# Patient Record
Sex: MALE | Race: OTHER | HISPANIC OR LATINO
[De-identification: names, ages, dates, MRNs, and addresses within clinical notes are randomized per-mention and may not be internally consistent; named-entity substitution may affect disease eponyms.]

---
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
## 2020-12-09 PROBLEM — M25.562 LEFT KNEE PAIN: Status: ACTIVE | Noted: 2020-12-09

## 2020-12-10 ENCOUNTER — APPOINTMENT (OUTPATIENT)
Dept: ORTHOPEDIC SURGERY | Facility: CLINIC | Age: 20
End: 2020-12-10

## 2020-12-10 DIAGNOSIS — M25.562 PAIN IN LEFT KNEE: ICD-10-CM

## 2020-12-17 ENCOUNTER — APPOINTMENT (OUTPATIENT)
Dept: ORTHOPEDIC SURGERY | Facility: CLINIC | Age: 20
End: 2020-12-17

## 2021-01-11 ENCOUNTER — APPOINTMENT (OUTPATIENT)
Dept: ORTHOPEDIC SURGERY | Facility: CLINIC | Age: 21
End: 2021-01-11
Payer: MEDICAID

## 2021-01-11 VITALS — TEMPERATURE: 97.8 F

## 2021-01-11 VITALS — BODY MASS INDEX: 25.76 KG/M2 | HEIGHT: 68 IN | WEIGHT: 170 LBS

## 2021-01-11 DIAGNOSIS — G89.29 DORSALGIA, UNSPECIFIED: ICD-10-CM

## 2021-01-11 DIAGNOSIS — M54.2 CERVICALGIA: ICD-10-CM

## 2021-01-11 DIAGNOSIS — M54.9 DORSALGIA, UNSPECIFIED: ICD-10-CM

## 2021-01-11 PROCEDURE — 99072 ADDL SUPL MATRL&STAF TM PHE: CPT

## 2021-01-11 PROCEDURE — 99204 OFFICE O/P NEW MOD 45 MIN: CPT

## 2021-01-11 PROCEDURE — 72040 X-RAY EXAM NECK SPINE 2-3 VW: CPT

## 2021-01-11 RX ORDER — NAPROXEN 500 MG/1
500 TABLET ORAL
Qty: 60 | Refills: 0 | Status: ACTIVE | COMMUNITY
Start: 2021-01-11 | End: 1900-01-01

## 2021-01-11 NOTE — DISCUSSION/SUMMARY
[Medication Risks Reviewed] : Medication risks reviewed [de-identified] : I discussed that his posture looks fine.  He tells me that he thinks the symptoms may be doing too extended periods of time playing video games looking down and looking up considerably.  He will use moist heat and has been started on Naprosyn 500 mg twice a day as a nonsteroidal anti-inflammatory.  He will call if there are problems with the medication or worsening of his symptoms and I will see him for follow-up in 4 weeks.

## 2021-01-11 NOTE — PHYSICAL EXAM
[de-identified] : He is fully alert and oriented with a normal mood and affect.  He is in no acute distress as I take the history.  He ambulates with a normal gait including tiptoe and heel walking.  There is no evidence of unsteadiness or spasticity.  On stance evaluation the shoulders and pelvis are level and his posture appears normal.  There is no evidence of any increased kyphosis.  Range of motion of the spine is normal.  There are no cutaneous abnormalities or palpable bony defects of the spine.  There is no evidence of shortness of breath or respiratory distress.  There is no paravertebral muscle spasm, sciatic notch tenderness or trochanteric tenderness.  His lower extremity neurological examination revealed 1-2+ symmetrical reflexes with downgoing toes.  Lower extremity motor power is normal to manual testing in all groups and sensation is normal to light touch in all dermatomes.  Straight leg raising is negative to 90 degrees in the sitting position.  His hips and his knees have full and painless range of motion with normal stability.  Vascular examination shows no evidence of varicosities and there is no lymphedema.  There are no cutaneous abnormalities of the upper or lower extremities.  His upper extremity neurological examination again reveals 1-2+ symmetrical reflexes.  Upper extremity motor power and sensation is normal.  He has a negative Lopez's.  Shoulder range of motion is full painless and symmetrical.  Range of motion of the cervical spine showed flexion with the chin reaching to within 2 fingerbreadths of the chest without pain or discomfort.  Extension was to 85 degrees with rotation of 90 degrees bilaterally and tilt to 40 degrees bilaterally with some discomfort at the extremes. [de-identified] : AP lateral x-rays of the cervical spine reveal some mild straightening of the normal cervical lordosis.  Sagittal alignment is otherwise normal and there are no destructive changes.

## 2021-01-11 NOTE — HISTORY OF PRESENT ILLNESS
[de-identified] : This 20-year-old male was seen today with a year or more of upper back symptoms.  He is concerned about his posture and relates that if the attempts to hold his neck extended he gets upper back ache and pain.  He has not had associated neurologic symptoms or arm pain.  There is no Valsalva effect.  There have been no changes in his gait or balance.  There is no history of injury.  He gets occasional night pain.  He does not have any daily habits that will typically aggravate and propagate neck and upper back symptoms.  His past medical history and review of systems are negative. [Pain Location] : pain [5] : a maximum pain level of 5/10 [Intermit.] : ~He/She~ states the symptoms seem to be intermittent

## 2021-01-14 ENCOUNTER — APPOINTMENT (OUTPATIENT)
Dept: ORTHOPEDIC SURGERY | Facility: CLINIC | Age: 21
End: 2021-01-14
Payer: MEDICAID

## 2021-01-14 VITALS
WEIGHT: 170 LBS | DIASTOLIC BLOOD PRESSURE: 72 MMHG | SYSTOLIC BLOOD PRESSURE: 110 MMHG | HEIGHT: 68 IN | BODY MASS INDEX: 25.76 KG/M2 | HEART RATE: 65 BPM

## 2021-01-14 VITALS — TEMPERATURE: 97.3 F

## 2021-01-14 DIAGNOSIS — M92.523 JUVENILE OSTEOCHONDROSIS OF TIBIA TUBERCLE, BILATERAL: ICD-10-CM

## 2021-01-14 PROCEDURE — 99072 ADDL SUPL MATRL&STAF TM PHE: CPT

## 2021-01-14 PROCEDURE — 99214 OFFICE O/P EST MOD 30 MIN: CPT

## 2021-01-14 PROCEDURE — 73564 X-RAY EXAM KNEE 4 OR MORE: CPT | Mod: LT

## 2021-01-14 RX ORDER — DICLOFENAC SODIUM 1 %
1 KIT TOPICAL
Qty: 1 | Refills: 0 | Status: ACTIVE | COMMUNITY
Start: 2021-01-14 | End: 1900-01-01

## 2021-01-19 NOTE — ADDENDUM
[FreeTextEntry1] : This note was written by Radha Morales on 01/14/2021 acting solely as a scribe for Dr. Alvarado Gallagher.\par \par All medical record entries made by the Scribe were at my, Dr. Alvarado Gallagher, direction and personally dictated by me on 01/14/2021. I have personally reviewed the chart and agree that the record accurately reflects my personal performance of the history, physical exam, assessment and plan.

## 2021-01-19 NOTE — DISCUSSION/SUMMARY
[de-identified] : 21 y/o male with left knee pain/Osgood Schlatter Disease. \par \par Clinically, patient has pain and swelling over the tibial tubicle consistent with known Osgood Schlatter disease. Xray shows ossicle at the tibial tubicle patella tendon insertion.  There may be some local irritation of the patella tendon, so I discussed the use of a Cho-pat brace.  Also recommended continued conservative management for his left knee pain with strengthening and conditioning that will improve stress across the tibial tubercle insertion.\par \par Recommendation: Chopat bracing. HEP. Conservative care & observation. OTC NSAID's or acetaminophen as tolerated, with application of ice to the area 2-3x daily for 20 minutes after periods of activity. \par \par Follow-up as needed.

## 2021-01-19 NOTE — PHYSICAL EXAM
[de-identified] : Oriented to time, place, person\par Mood: Normal\par Affect: Normal\par Appearance: Healthy, well appearing, no acute distress.\par Gait: Normal\par Assistive Devices: None \par \par Left Knee Exam:\par \par Skin: Clean, dry, intact\par Inspection: No obvious malalignment, no masses, mild swelling over tibial tubicle, no effusion\par Pulses: 2+ DP/PT pulses \par ROM: 0-140 degrees of flexion. No pain with deep knee flexion/extension.\par Tenderness: +pain over tibial tubicle. No MJLT. No LJLT. No pain over the patella facets. No pain to the quadriceps tendon. No pain to the patella tendon. No posterior knee tenderness.\par Stability: Stable to varus, valgus. Negative Lachman testing. Negative anterior drawer, negative posterior drawer.\par Strength: 5/5 Q/H/TA/GS/EHL, without atrophy\par Neuro: Intact to light touch throughout, DTRs normal\par Additional Tests: Negative Zena's test, Negative patellar grind test  [de-identified] : The following radiographs were ordered and read by me during this patients visit. I reviewed each radiograph in detail with the patient and discussed the findings as highlighted below. \par \par 4 views of the left knee were obtained today, 01/14/2021, that show no acute fracture or dislocation. There is a ossicle at the tibial tubicle patella tendon insertion. There is no medial, no lateral and no patellofemoral degenerative changes seen. There is no significant malalignment. No significant other obvious osseous abnormality, otherwise unremarkable.

## 2021-01-19 NOTE — HISTORY OF PRESENT ILLNESS
[de-identified] : 20 year old male presents today with left knee pain since 2016. No injury reported. Pain is localized to anterior knee. Patient also states there is bump located on the anterior knee. The pain is brought on with activity and prolong sitting. He is not taking pain medication. Icing after playing basketball is a little helpful. He was evaluated 3-4 years ago for knee pain and was told that pain will resolve on its own. Reports occasional buckling. Denies buckling, catching, locking, numbness or tingling. \par \par The patient's past medical history, past surgical history, medications and allergies were reviewed by me today with the patient and documented accordingly. In addition, the patient's family and social history, which were noncontributory to this visit, were reviewed also.

## 2021-02-04 ENCOUNTER — APPOINTMENT (OUTPATIENT)
Dept: ORTHOPEDIC SURGERY | Facility: CLINIC | Age: 21
End: 2021-02-04

## 2021-09-17 ENCOUNTER — NON-APPOINTMENT (OUTPATIENT)
Age: 21
End: 2021-09-17

## 2021-09-17 ENCOUNTER — APPOINTMENT (OUTPATIENT)
Dept: ORTHOPEDIC SURGERY | Facility: CLINIC | Age: 21
End: 2021-09-17
Payer: COMMERCIAL

## 2021-09-17 VITALS — WEIGHT: 170 LBS | HEIGHT: 68 IN | BODY MASS INDEX: 25.76 KG/M2

## 2021-09-17 DIAGNOSIS — G89.29 PAIN IN RIGHT KNEE: ICD-10-CM

## 2021-09-17 DIAGNOSIS — M25.561 PAIN IN RIGHT KNEE: ICD-10-CM

## 2021-09-17 DIAGNOSIS — M25.562 PAIN IN RIGHT KNEE: ICD-10-CM

## 2021-09-17 PROCEDURE — 99214 OFFICE O/P EST MOD 30 MIN: CPT

## 2021-09-17 PROCEDURE — 73562 X-RAY EXAM OF KNEE 3: CPT | Mod: 50

## 2023-11-14 ENCOUNTER — EMERGENCY (EMERGENCY)
Facility: HOSPITAL | Age: 23
LOS: 1 days | Discharge: HOME | End: 2023-11-14
Attending: EMERGENCY MEDICINE | Admitting: EMERGENCY MEDICINE
Payer: COMMERCIAL

## 2023-11-14 VITALS
RESPIRATION RATE: 15 BRPM | HEART RATE: 90 BPM | TEMPERATURE: 98 F | OXYGEN SATURATION: 100 % | DIASTOLIC BLOOD PRESSURE: 61 MMHG | SYSTOLIC BLOOD PRESSURE: 124 MMHG

## 2023-11-14 PROCEDURE — 99283 EMERGENCY DEPT VISIT LOW MDM: CPT

## 2023-11-14 RX ORDER — ACETAMINOPHEN 500 MG
650 TABLET ORAL ONCE
Refills: 0 | Status: COMPLETED | OUTPATIENT
Start: 2023-11-14 | End: 2023-11-14

## 2023-11-14 RX ADMIN — Medication 650 MILLIGRAM(S): at 07:04

## 2023-11-14 NOTE — ED ADULT TRIAGE NOTE - CHIEF COMPLAINT QUOTE
presents C/O blurry vision in right after rubbing it 15 mins ago. . Pt. believes he got oil in eye. denies pain in right eye.  Phx anxiety

## 2023-11-14 NOTE — ED PROVIDER NOTE - CLINICAL SUMMARY MEDICAL DECISION MAKING FREE TEXT BOX
23M p/w R eye blurry vision after rubbing it this morning.  Pt thinks somehow some oil got in his eye and it was blurry.  Pt woke up normal.  Pt didn't have any oil on his face nor did he put any in his eye, did not have product on his head, eye or hand.  Pt also had a R sided headache, gradual onset.  Never had eye irritation like this before.  Pt reports some decreased vision transiently on R lateral visual field but it is better now.  Pt gets headaches intermittently but has not been evaluated by a doctor, but was told by a PT friend of his it could be migraines.  Pt mild discomfort HA and R eye irritation, rubbing it.  Pt has 20/20 vision, with glasses on, in both eyes.  Visual fields intact by confrontation.  No corneal uptake on fluorescein exam.  Cornea clear, pupil reactive.  No conjunctival redness or swelling.  No periorbital swelling, redness, or tenderness.  EOMS intact.  Normal rest of neuro exam.  Possibly migraine vs other headache syndrome, unlikely ICH or meningitis given normal VS and well appearing and normal neuro exam.  Discussed diagnostic options including staying here for workup vs following up outpatient with ophtho in next few days and neuro.  Pt given ophtho and neuro lists and will place request in for discharge lounge.  Pt given lubricating drops he can use for eye irritation.  Pt knows to RTER for new or worsening symptoms. 23M p/w R eye blurry vision after rubbing it this morning.  Pt thinks somehow some oil got in his eye and it was blurry.  Pt woke up normal.  Pt didn't have any oil on his face nor did he put any in his eye, did not have product on his head, eye or hand.  Pt also had a R sided headache, gradual onset.  Never had eye irritation like this before.  Pt reports some decreased vision transiently on R lateral visual field but it is better now.  Pt gets headaches intermittently but has not been evaluated by a doctor, but was told by a PT friend of his it could be migraines.  Pt mild discomfort HA and R eye irritation, rubbing it.  Pt has 20/20 vision, with glasses on, in both eyes.  Visual fields intact by confrontation.  No corneal uptake on fluorescein exam.  Cornea clear, pupil reactive.  No conjunctival redness or swelling.  No periorbital swelling, redness, or tenderness.  EOMS intact.  Normal rest of neuro exam.  Possibly migraine vs other headache syndrome, unlikely ICH or meningitis given normal VS and well appearing and normal neuro exam.  Discussed diagnostic options including staying here for workup vs following up outpatient with ophtho in next few days and neuro.  Pt given ophtho and neuro lists and will place request in for discharge lounge.  Pt given lubricating drops he can use for eye irritation.  Pt knows to RTER for new or worsening symptoms.  Pt given a bottle of artificial tears for use.

## 2023-11-14 NOTE — ED PROVIDER NOTE - PHYSICAL EXAMINATION
VS:  unremarkable    GEN - NAD;   well appearing;   A+O x3  Mild discomfort R eye.    HEAD - NC/AT     ENT - PEERL, EOMI, mucous membranes    moist , no discharge    Pt has 20/20 vision, with glasses on, in both eyes.  Visual fields intact by confrontation.  No corneal uptake on fluorescein exam.  Cornea clear, pupil reactive.  No conjunctival redness or swelling.  No periorbital swelling, redness, or tenderness.  EOMS intact.   NECK: Neck supple, non-tender without lymphadenopathy, no masses, no JVD  PULM - CTA b/l,  symmetric breath sounds  COR -  normal heart sounds    ABD - , ND, NT, soft,  BACK - no CVA tenderness, nontender spine     EXTREMS - no edema, no deformity, warm and well perfused    SKIN - no rash    or bruising      NEUROLOGIC - alert, face symmetric, speech fluent, sensation nl, motor no focal deficit.

## 2023-11-14 NOTE — ED ADULT NURSE NOTE - OBJECTIVE STATEMENT
24 y/o male, a&ox4, ambulatory, received to ED for eye pain. Pt reports getting oil into his eye, and experiencing blurry vision. No signs of redness, injury, or trauma noted to the right eye. Airway is patent, speaking in clear and coherent sentences. Respirations are even and unlabored, no signs of respiratory distress. Pt medicated as per MD orders.

## 2023-11-14 NOTE — ED PROVIDER NOTE - NSFOLLOWUPINSTRUCTIONS_ED_ALL_ED_FT
FOLLOW UP WITH YOUR  NEUROLOGY WITHIN 1 WEEK  BRING THE COPIES OF YOUR RESULTS WITH YOU (PROVIDED)  CAN TAKE TYLENOL 650MG ORALLY EVERY 6 HOURS FOR PAIN OR FEVER.  IBUPROFEN 400MG ORALLY EVERY 6 HOURS FOR PAIN OR FEVER.    CAN TAKE TYLENOL AND IBUPROFEN AT THE SAME TIME  RETURN TO ED FOR NEW OR WORSENING SYMPTOMS.    FOLLOW UP WITH OPHTHALMOLOGY WITHIN NEXT 2-3 DAYS FOR EYE IRRITATION.      CAN USE ARTIFICIAL TEARS EVERY 1-2 HOURS AS NEEDED FOR EYE IRRITATION

## 2023-11-14 NOTE — ED PROVIDER NOTE - OBJECTIVE STATEMENT
23M p/w R eye blurry vision after rubbing it this morning.  Pt thinks somehow some oil got in his eye and it was blurry.  Pt woke up normal.  Pt didn't have any oil on his face nor did he put any in his eye, did not have product on his head, eye or hand.  Pt also had a R sided headache, gradual onset.  Never had eye irritation like this before.  Pt reports some decreased vision transiently on R lateral visual field but it is better now.  Pt gets headaches intermittently but has not been evaluated by a doctor, but was told by a PT friend of his it could be migraines.  Pt mild discomfort HA and R eye irritation, rubbing it.  Pt has 20/20 vision, with glasses on, in both eyes.  Visual fields intact by confrontation.  No corneal uptake on fluorescein exam.  Cornea clear, pupil reactive.  No conjunctival redness or swelling.  No periorbital swelling, redness, or tenderness.  EOMS intact.  Normal rest of neuro exam.  Possibly migraine vs other headache syndrome, unlikely ICH or meningitis given normal VS and well appearing and normal neuro exam.  Discussed diagnostic options including staying here for workup vs following up outpatient with ophtho in next few days and neuro.  Pt given ophtho and neuro lists and will place request in for discharge lounge.  Pt given lubricating drops he can use for eye irritation.  Pt knows to RTER for new or worsening symptoms.

## 2023-11-14 NOTE — ED ADULT TRIAGE NOTE - RESPIRATORY RATE (BREATHS/MIN)
15 VTAMA Counseling: I discussed with the patient that VTAMA is not for use in the eyes, mouth or mouth. They should call the office if they develop any signs of allergic reactions to VTAMA. The patient verbalized understanding of the proper use and possible adverse effects of VTAMA.  All of the patient's questions and concerns were addressed.

## 2023-11-14 NOTE — ED PROVIDER NOTE - PATIENT PORTAL LINK FT
You can access the FollowMyHealth Patient Portal offered by [facility] by registering at the following website: [email or web address]. By joining [facility]’s FollowMyHealth portal, you will also be able to view your health information using other applications (apps) compatible with our system.

## 2023-11-17 ENCOUNTER — APPOINTMENT (OUTPATIENT)
Dept: OPHTHALMOLOGY | Facility: CLINIC | Age: 23
End: 2023-11-17

## 2023-11-22 ENCOUNTER — APPOINTMENT (OUTPATIENT)
Dept: OPHTHALMOLOGY | Facility: CLINIC | Age: 23
End: 2023-11-22
Payer: COMMERCIAL

## 2023-11-22 ENCOUNTER — NON-APPOINTMENT (OUTPATIENT)
Age: 23
End: 2023-11-22

## 2023-11-22 PROCEDURE — 92004 COMPRE OPH EXAM NEW PT 1/>: CPT

## 2023-12-05 ENCOUNTER — APPOINTMENT (OUTPATIENT)
Dept: PAIN MANAGEMENT | Facility: CLINIC | Age: 23
End: 2023-12-05

## 2023-12-11 ENCOUNTER — APPOINTMENT (OUTPATIENT)
Dept: PAIN MANAGEMENT | Facility: CLINIC | Age: 23
End: 2023-12-11

## 2024-01-04 ENCOUNTER — APPOINTMENT (OUTPATIENT)
Dept: PAIN MANAGEMENT | Facility: CLINIC | Age: 24
End: 2024-01-04

## 2024-03-24 ENCOUNTER — EMERGENCY (EMERGENCY)
Facility: HOSPITAL | Age: 24
LOS: 1 days | Discharge: HOME | End: 2024-03-24
Attending: EMERGENCY MEDICINE | Admitting: EMERGENCY MEDICINE
Payer: COMMERCIAL

## 2024-03-24 VITALS
RESPIRATION RATE: 16 BRPM | OXYGEN SATURATION: 98 % | HEART RATE: 74 BPM | DIASTOLIC BLOOD PRESSURE: 70 MMHG | SYSTOLIC BLOOD PRESSURE: 120 MMHG | TEMPERATURE: 98 F

## 2024-03-24 VITALS
HEART RATE: 76 BPM | RESPIRATION RATE: 16 BRPM | TEMPERATURE: 97 F | OXYGEN SATURATION: 97 % | DIASTOLIC BLOOD PRESSURE: 75 MMHG | SYSTOLIC BLOOD PRESSURE: 123 MMHG

## 2024-03-24 LAB
ALBUMIN SERPL ELPH-MCNC: 4.7 G/DL — SIGNIFICANT CHANGE UP (ref 3.3–5)
ALP SERPL-CCNC: 100 U/L — SIGNIFICANT CHANGE UP (ref 40–120)
ALT FLD-CCNC: 13 U/L — SIGNIFICANT CHANGE UP (ref 4–41)
ANION GAP SERPL CALC-SCNC: 11 MMOL/L — SIGNIFICANT CHANGE UP (ref 7–14)
APTT BLD: 35.6 SEC — SIGNIFICANT CHANGE UP (ref 24.5–35.6)
AST SERPL-CCNC: 20 U/L — SIGNIFICANT CHANGE UP (ref 4–40)
BASOPHILS # BLD AUTO: 0.03 K/UL — SIGNIFICANT CHANGE UP (ref 0–0.2)
BASOPHILS NFR BLD AUTO: 0.4 % — SIGNIFICANT CHANGE UP (ref 0–2)
BILIRUB SERPL-MCNC: 0.4 MG/DL — SIGNIFICANT CHANGE UP (ref 0.2–1.2)
BUN SERPL-MCNC: 16 MG/DL — SIGNIFICANT CHANGE UP (ref 7–23)
CALCIUM SERPL-MCNC: 10.1 MG/DL — SIGNIFICANT CHANGE UP (ref 8.4–10.5)
CHLORIDE SERPL-SCNC: 101 MMOL/L — SIGNIFICANT CHANGE UP (ref 98–107)
CO2 SERPL-SCNC: 28 MMOL/L — SIGNIFICANT CHANGE UP (ref 22–31)
CREAT SERPL-MCNC: 1.05 MG/DL — SIGNIFICANT CHANGE UP (ref 0.5–1.3)
EGFR: 102 ML/MIN/1.73M2 — SIGNIFICANT CHANGE UP
EOSINOPHIL # BLD AUTO: 0.26 K/UL — SIGNIFICANT CHANGE UP (ref 0–0.5)
EOSINOPHIL NFR BLD AUTO: 3.4 % — SIGNIFICANT CHANGE UP (ref 0–6)
GLUCOSE SERPL-MCNC: 95 MG/DL — SIGNIFICANT CHANGE UP (ref 70–99)
HCT VFR BLD CALC: 42.4 % — SIGNIFICANT CHANGE UP (ref 39–50)
HGB BLD-MCNC: 13.9 G/DL — SIGNIFICANT CHANGE UP (ref 13–17)
IANC: 4.9 K/UL — SIGNIFICANT CHANGE UP (ref 1.8–7.4)
IMM GRANULOCYTES NFR BLD AUTO: 0.3 % — SIGNIFICANT CHANGE UP (ref 0–0.9)
INR BLD: 1.06 RATIO — SIGNIFICANT CHANGE UP (ref 0.85–1.18)
LYMPHOCYTES # BLD AUTO: 1.69 K/UL — SIGNIFICANT CHANGE UP (ref 1–3.3)
LYMPHOCYTES # BLD AUTO: 21.9 % — SIGNIFICANT CHANGE UP (ref 13–44)
MCHC RBC-ENTMCNC: 27.1 PG — SIGNIFICANT CHANGE UP (ref 27–34)
MCHC RBC-ENTMCNC: 32.8 GM/DL — SIGNIFICANT CHANGE UP (ref 32–36)
MCV RBC AUTO: 82.8 FL — SIGNIFICANT CHANGE UP (ref 80–100)
MONOCYTES # BLD AUTO: 0.81 K/UL — SIGNIFICANT CHANGE UP (ref 0–0.9)
MONOCYTES NFR BLD AUTO: 10.5 % — SIGNIFICANT CHANGE UP (ref 2–14)
NEUTROPHILS # BLD AUTO: 4.9 K/UL — SIGNIFICANT CHANGE UP (ref 1.8–7.4)
NEUTROPHILS NFR BLD AUTO: 63.5 % — SIGNIFICANT CHANGE UP (ref 43–77)
NRBC # BLD: 0 /100 WBCS — SIGNIFICANT CHANGE UP (ref 0–0)
NRBC # FLD: 0 K/UL — SIGNIFICANT CHANGE UP (ref 0–0)
PLATELET # BLD AUTO: 247 K/UL — SIGNIFICANT CHANGE UP (ref 150–400)
POTASSIUM SERPL-MCNC: 4.2 MMOL/L — SIGNIFICANT CHANGE UP (ref 3.5–5.3)
POTASSIUM SERPL-SCNC: 4.2 MMOL/L — SIGNIFICANT CHANGE UP (ref 3.5–5.3)
PROT SERPL-MCNC: 8.4 G/DL — HIGH (ref 6–8.3)
PROTHROM AB SERPL-ACNC: 11.9 SEC — SIGNIFICANT CHANGE UP (ref 9.5–13)
RBC # BLD: 5.12 M/UL — SIGNIFICANT CHANGE UP (ref 4.2–5.8)
RBC # FLD: 14 % — SIGNIFICANT CHANGE UP (ref 10.3–14.5)
SODIUM SERPL-SCNC: 140 MMOL/L — SIGNIFICANT CHANGE UP (ref 135–145)
WBC # BLD: 7.71 K/UL — SIGNIFICANT CHANGE UP (ref 3.8–10.5)
WBC # FLD AUTO: 7.71 K/UL — SIGNIFICANT CHANGE UP (ref 3.8–10.5)

## 2024-03-24 PROCEDURE — 99285 EMERGENCY DEPT VISIT HI MDM: CPT

## 2024-03-24 PROCEDURE — 70496 CT ANGIOGRAPHY HEAD: CPT | Mod: 26,MC

## 2024-03-24 PROCEDURE — 70498 CT ANGIOGRAPHY NECK: CPT | Mod: 26,MC

## 2024-03-24 RX ORDER — SODIUM CHLORIDE 9 MG/ML
1000 INJECTION INTRAMUSCULAR; INTRAVENOUS; SUBCUTANEOUS ONCE
Refills: 0 | Status: COMPLETED | OUTPATIENT
Start: 2024-03-24 | End: 2024-03-24

## 2024-03-24 RX ORDER — IBUPROFEN 200 MG
400 TABLET ORAL ONCE
Refills: 0 | Status: COMPLETED | OUTPATIENT
Start: 2024-03-24 | End: 2024-03-24

## 2024-03-24 RX ORDER — METOCLOPRAMIDE HCL 10 MG
10 TABLET ORAL ONCE
Refills: 0 | Status: COMPLETED | OUTPATIENT
Start: 2024-03-24 | End: 2024-03-24

## 2024-03-24 RX ADMIN — Medication 10 MILLIGRAM(S): at 19:03

## 2024-03-24 RX ADMIN — Medication 400 MILLIGRAM(S): at 17:58

## 2024-03-24 RX ADMIN — SODIUM CHLORIDE 1000 MILLILITER(S): 9 INJECTION INTRAMUSCULAR; INTRAVENOUS; SUBCUTANEOUS at 19:03

## 2024-03-24 NOTE — ED PROVIDER NOTE - PATIENT PORTAL LINK FT
You can access the FollowMyHealth Patient Portal offered by [facility] by registering at the following website: [email or web address]. By joining [employer]’s FollowMyHealth portal, you will also be able to view your health information using other applications (apps) compatible with our system.

## 2024-03-24 NOTE — CONSULT NOTE ADULT - SUBJECTIVE AND OBJECTIVE BOX
Neurology - Consult Note    -  Spectra: [id number] ([facility]), [phone] ([facility])  -    HPI: A 23 year old R handed male with PMH of self-diagnosed migraine presents due to headache earlier today. Pt states that earlier today around 4 pm pt was playing video games and experienced a gradual onset scalp headache (nonposiitional and nonradiating) that was dull in sensation, at its worst 7/10. Pt states that right prior to the headache he experienced vision loss in temporal fiend of both eyes for about 15 minutes after which he got the headache. Headache was associated with nausea (no vomiting), photophobia, phonophobia flashes of light, dehydration. Headache was present for about 30 minutes after which it subsided. Pt states that he took Excedrin but states that it did not really help. During that time he also states that he picked up a case of water and felt pressure behind of eyes, with complete  loss of vision for a few seconds.  Currently feels back towards his baseline.     Pt states for the last few years starting about 6 years ago- no prior head trauma noted.  He has been experiencing similar headache every 2-3 months preceded by blurry vision/loss of vision. But states that the headache can be monthly when he plays sports. Pt denies any recent numbness, tingling, focal weakness, changes in hearing, trauma. Denies ever having an MRI of the brain in the past.       Review of Systems:    CONSTITUTIONAL: No fevers or chills  NEUROLOGICAL: +As stated in HPI above  SKIN: No itching, burning, rashes, or lesions   All other review of systems is negative unless indicated above.    Allergies:  No Known Allergies      PMHx/PSHx/Family Hx: As above, otherwise see below   No pertinent past medical history    Anxiety        Social Hx:  No current use of tobacco, alcohol, or illicit drugs    Medications:  MEDICATIONS  (STANDING):    MEDICATIONS  (PRN):      Vitals:  T(C): 36.3 (03-24-24 @ 16:28), Max: 36.3 (03-24-24 @ 16:28)  HR: 76 (03-24-24 @ 16:28) (76 - 76)  BP: 123/75 (03-24-24 @ 16:28) (123/75 - 123/75)  RR: 16 (03-24-24 @ 16:28) (16 - 16)  SpO2: 97% (03-24-24 @ 16:28) (97% - 97%)    Physical Examination:   General - NAD  Cardiovascular - Peripheral pulses palpable, no edema  Eyes -  Fundoscopy not performed due to safety precautions in the setting of the COVID-19 pandemic    Neurologic Exam:  Mental status - Awake, Alert, Oriented to person, place, and time. Speech fluent, repetition and naming intact. Follows simple and complex commands. Attention/concentration, recent and remote memory (including registration and recall), and fund of knowledge intact    Cranial nerves - PERRLA, VFF, EOMI, face sensation (V1-V3) intact b/l, facial strength intact without asymmetry b/l, hearing intact b/l, palate with symmetric elevation, trapezius  5/5 strength b/l, tongue midline on protrusion with full lateral movement    Motor - Normal bulk and tone throughout. No pronator drift.  Strength testing            Deltoid      Biceps      Triceps     Wrist Extension    Wrist Flexion     Interossei         R            5                 5               5                     5                              5                        5                 5  L             5                 5               5                     5                              5                        5                 5              Hip Flexion    Hip Extension    Knee Flexion    Knee Extension    Dorsiflexion    Plantar Flexion  R              5                           5                       5                           5                            5                          5  L              5                           5                        5                           5                            5                          5    Sensation - Light touch/temperature OR pain/vibration intact throughout    DTR's -             Biceps      Triceps     Brachioradialis      Patellar    Ankle    Toes/plantar response  R             2+             2+                  2+                       2+            2+                 Down  L              2+             2+                 2+                        2+           2+                 Down    Coordination - Finger to Nose intact b/l. No tremors appreciated    Gait and station - Normal casual gait. Romberg (-)    Labs:                        13.9   7.71  )-----------( 247      ( 24 Mar 2024 18:45 )             42.4     03-24    140  |  101  |  16  ----------------------------<  95  4.2   |  28  |  1.05    Ca    10.1      24 Mar 2024 18:45    TPro  8.4<H>  /  Alb  4.7  /  TBili  0.4  /  DBili  x   /  AST  20  /  ALT  13  /  AlkPhos  100  03-24    CAPILLARY BLOOD GLUCOSE        LIVER FUNCTIONS - ( 24 Mar 2024 18:45 )  Alb: 4.7 g/dL / Pro: 8.4 g/dL / ALK PHOS: 100 U/L / ALT: 13 U/L / AST: 20 U/L / GGT: x             PT/INR - ( 24 Mar 2024 18:45 )   PT: 11.9 sec;   INR: 1.06 ratio         PTT - ( 24 Mar 2024 18:45 )  PTT:35.6 sec  CSF:                  Radiology:     Neurology - Consult Note    -  Spectra: [phone] ([facility]), [phone] ([facility])  -    HPI: A 23 year old R handed male with PMH of self-diagnosed migraine presents due to headache earlier today. Pt states that earlier today around 4 pm pt was playing video games and experienced a gradual onset scalp headache (nonposiitional and nonradiating) that was dull in sensation, at its worst 7/10. Pt states that right prior to the headache he experienced vision loss in temporal fiend of both eyes for about 15 minutes after which he got the headache. Headache was associated with nausea (no vomiting), photophobia, phonophobia flashes of light, dehydration. Headache was present for about 30 minutes after which it subsided. Pt states that he took Excedrin but states that it did not really help. During that time he also states that he picked up a case of water and felt pressure behind of eyes, with complete  loss of vision for a few seconds.  Currently feels back towards his baseline.     Pt states for the last few years starting about 6 years ago- no prior head trauma noted.  He has been experiencing similar headache every 2-3 months preceded by blurry vision/loss of vision. But states that the headache can be monthly when he plays sports. Pt denies any recent numbness, tingling, focal weakness, changes in hearing, trauma. Denies ever having an MRI of the brain in the past.       Review of Systems:    CONSTITUTIONAL: No fevers or chills  NEUROLOGICAL: +As stated in HPI above  SKIN: No itching, burning, rashes, or lesions   All other review of systems is negative unless indicated above.    Allergies:  No Known Allergies      PMHx/PSHx/Family Hx: As above, otherwise see below   No pertinent past medical history    Anxiety        Social Hx:  No current use of tobacco, alcohol, or illicit drugs    Medications:  MEDICATIONS  (STANDING):    MEDICATIONS  (PRN):      Vitals:  T(C): 36.3 (03-24-24 @ 16:28), Max: 36.3 (03-24-24 @ 16:28)  HR: 76 (03-24-24 @ 16:28) (76 - 76)  BP: 123/75 (03-24-24 @ 16:28) (123/75 - 123/75)  RR: 16 (03-24-24 @ 16:28) (16 - 16)  SpO2: 97% (03-24-24 @ 16:28) (97% - 97%)    Physical Examination:   General - NAD  Cardiovascular - Peripheral pulses palpable, no edema  Eyes -  Fundoscopy not performed due to safety precautions in the setting of the COVID-19 pandemic    Neurologic Exam:  Mental status - Awake, Alert, Oriented to person, place, and time. Speech fluent, repetition and naming intact. Follows simple and complex commands. Attention/concentration, recent and remote memory (including registration and recall), and fund of knowledge intact    Cranial nerves - PERRLA, VFF, EOMI, face sensation (V1-V3) intact b/l, facial strength intact without asymmetry b/l, hearing intact b/l, palate with symmetric elevation, trapezius  5/5 strength b/l, tongue midline on protrusion with full lateral movement    Motor - Normal bulk and tone throughout. No pronator drift.  Strength testing            Deltoid      Biceps      Triceps     Wrist Extension    Wrist Flexion     Interossei         R            5                 5               5                     5                              5                        5                 5  L             5                 5               5                     5                              5                        5                 5              Hip Flexion    Hip Extension    Knee Flexion    Knee Extension    Dorsiflexion    Plantar Flexion  R              5                           5                       5                           5                            5                          5  L              5                           5                        5                           5                            5                          5    Sensation - Light touch/temperature OR pain/vibration intact throughout    DTR's -             Biceps      Triceps     Brachioradialis      Patellar    Ankle    Toes/plantar response  R             2+             2+                  2+                       2+            2+                 Down  L              2+             2+                 2+                        2+           2+                 Down    Coordination - Finger to Nose intact b/l. No tremors appreciated    Gait and station - Normal casual gait. Romberg (-)    Labs:                        13.9   7.71  )-----------( 247      ( 24 Mar 2024 18:45 )             42.4     03-24    140  |  101  |  16  ----------------------------<  95  4.2   |  28  |  1.05    Ca    10.1      24 Mar 2024 18:45    TPro  8.4<H>  /  Alb  4.7  /  TBili  0.4  /  DBili  x   /  AST  20  /  ALT  13  /  AlkPhos  100  03-24    CAPILLARY BLOOD GLUCOSE        LIVER FUNCTIONS - ( 24 Mar 2024 18:45 )  Alb: 4.7 g/dL / Pro: 8.4 g/dL / ALK PHOS: 100 U/L / ALT: 13 U/L / AST: 20 U/L / GGT: x             PT/INR - ( 24 Mar 2024 18:45 )   PT: 11.9 sec;   INR: 1.06 ratio         PTT - ( 24 Mar 2024 18:45 )  PTT:35.6 sec  CSF:                  Radiology:    < from: CT Angio Neck w/ IV Cont (03.24.24 @ 20:21) >  IMPRESSION:    CT HEAD: No acute intracranial bleeding.    CTA BRAIN: Patent intracranial circulation. No flow-limiting stenosis or   occlusion.    Fetal right PCA origin, anatomic variant.    CTA NECK: Patent cervical vasculature. No flow limiting stenosis or   occlusion.      < end of copied text >

## 2024-03-24 NOTE — ED PROVIDER NOTE - PROGRESS NOTE DETAILS
Radha PGY-1: neuro consulted, will see pt in ED after CT H/N ELSA Mcnair: Received sign out pending CTA read and neuro eval.   CTA negative for acute findings, neurology cleared patient for discharge, recommend outpatient follow up.  Pt well appearing, asymptomatic, vss, will dc home.

## 2024-03-24 NOTE — ED PROVIDER NOTE - NSFOLLOWUPINSTRUCTIONS_ED_ALL_ED_FT
You may take 3 advil every 8 hours as needed for headache.  Drink plenty of fluids.  Avoid common migraine triggers (artificial sweeteners, food preservative (MSG), aged cheese, skipping meals, change in wake/sleep cycle and intense physical exertion).  Lifestyle changes that help migraine: physical exercise, fixed meal time, fixed sleep/wake cycle, avoid smoking and highly caffeinated products.  Follow up with Dr. Nissenbaum after discharge.  call [phone] to schedule an appointment. Office is located at [address] [place] [address].  Return to ED for any worsening visual changes, vomiting, weakness or any other concerning symptoms.

## 2024-03-24 NOTE — ED PROVIDER NOTE - CLINICAL SUMMARY MEDICAL DECISION MAKING FREE TEXT BOX
Presentation low concern for intracranial mass as neuro exam non-focal, no alarming symptoms such as morning headache. History not consistent with ICH. No indication for head imaging. Low concern for meningitis given afebrile well appearing no nuchal rigidity. Photophobia mild. Headache and associated symptoms including vision change similar to prior episodes, currently w/ normal vision 20/20 b/l. Possibly migraine. Will trial motrin. Reassess, dispo likely dc with o/p f/u with neurology. Presentation low concern for intracranial mass as neuro exam non-focal, no alarming symptoms such as morning headache. History not consistent with ICH. Low concern for meningitis given afebrile well appearing no nuchal rigidity. Photophobia mild. Headache and associated symptoms including vision change similar to prior episodes, currently w/ normal vision 20/20 b/l. Possibly complex migraine.   However with vision change described as darkening of visual field excepted left lower quadrant, need to eval for vascular causes.  Obtain CT head and neck angio.  Neuroconsult. Motrin. Reassess, dispo pending workup.

## 2024-03-24 NOTE — ED PROVIDER NOTE - PHYSICAL EXAMINATION
Vital signs reviewed.  CONSTITUTIONAL: NAD, awake  HEAD: Normocephalic; atraumatic  EYES: EOMI, PERLL, no conjunctival injection, vision 20/20 b/l with glasses on.   MOUTH/THROAT:  MMM  NECK: Trachea midline  CV: Normal S1, S2; no audible murmurs; extremities WWP  RESP: normal work of breathing; CTAB   ABD: soft, non-distended; non-tender  : Deferred  MSK/EXT: no edema, no limited ROM  SKIN: No rashes on exposed skin surfaces  NEURO: 5/5 strength in b/l elbow flexio/extension, , hip flexion, knee extension/flexion, plantarflexion/dorsiflexion. Sensation intact to light touch. FTN intact. Gait ataxic. CN III-XII grossly intact.

## 2024-03-24 NOTE — ED PROVIDER NOTE - OBJECTIVE STATEMENT
23yom w/ hx of self-diagnosed migraine p/w headache about 1h prior to presentation. Pt states for the last few years, he has been experiencing headache every 2-3 months preceeded by blurry vision, and a feeling that something is coming. Associated with nausea. vomiting, photophobia and phonophobia. Pt states he was playing video games when suddenly part of his vision went dark. He then started to feel L sided and top headache, dull, non-radiating. Vision change lasted for about 15min. Reports headache and vision change episode is similar to prior episodes. Took exedrine with some relief, now pain 5/10. Endorse photophobia and phonophobia. Denies fever, chills, weakness, numbness, tingling, neck stiffness.          Father at bedside notes pt has been having anxiety and depression. Interviewed pt without father's presence. Pt states he has been stressed recently, experiencing anxiety, but denies SI/HI, visual/auditory hallucination. 23yom w/ hx of self-diagnosed migraine p/w headache about 1h prior to presentation. Pt states for the last few years, he has been experiencing headache every 2-3 months preceeded by blurry vision, and a feeling that something is coming. Associated with nausea. vomiting, photophobia and phonophobia. Pt states he was playing video games when suddenly part of his vision went dark. Described as only can see in the left lower quadrant. He then started to feel L sided and top headache, dull, non-radiating. Vision change lasted for about 15min. Reports headache and vision change episode is similar to prior episodes. Took exedrine with some relief, now pain 5/10. Endorse photophobia and phonophobia. Denies fever, chills, weakness, numbness, tingling, neck stiffness.          Father at bedside notes pt has been having anxiety and depression. Interviewed pt without father's presence. Pt states he has been stressed recently, experiencing anxiety, but denies SI/HI, visual/auditory hallucination.

## 2024-03-24 NOTE — ED ADULT NURSE NOTE - OBJECTIVE STATEMENT
Pt arrives to the ED complaining of a migraine that started this am. Pt has not medical hx. Pt is A and OX4 and ambulatory. Pt denies dizziness, chest pain, shortness of breath, n/v, fever and chills, numbness and tingling. Airway is patent, respirations are even and unlabored. Pt medicated per MAR> Plan of care ongoing, safety maintained.

## 2024-03-24 NOTE — CONSULT NOTE ADULT - ASSESSMENT
HPI: A 23 year old R handed male with PMH of self-diagnosed migraine presents due to headache earlier today. Pt states that earlier today around 4 pm pt was playing video games and experienced a gradual onset scalp headache (nonposiitional and nonradiating) that was dull in sensation, at its worst 7/10. Pt states that right prior to the headache he experienced vision loss in temporal fiend of both eyes for about 15 minutes after which he got the headache. Headache was associated with nausea (no vomiting), photophobia, phonophobia flashes of light, dehydration. Headache was present for about 30 minutes after which it subsided. Pt states that he took Excedrin but states that it did not really help. During that time he also states that he picked up a case of water and felt pressure behind of eyes, with complete  loss of vision for a few seconds.  Currently feels back towards his baseline. Pt states for the last few years starting about 6 years ago- no prior head trauma noted.  He has been experiencing similar headache every 2-3 months preceded by blurry vision/loss of vision. But states that the headache can be monthly when he plays sports. Pt denies any recent numbness, tingling, focal weakness, changes in hearing, trauma. Denies ever having an MRI of the brain in the past.     Impression: Headache of unclear etiology at this time- possibly migrainous primary headache if no underlying factors, but cannot exclude secondary headaches without further work up at this time.     Recommendations:   [] F/U CT/CTA head  []MR brain and orbits  with and without contrast  []If good kidney function, toradol 30 mg IV push + 10 mg reglan IV push + 25 mg benadryl IV push at once   Otherwise Tylenol 1 gram IV + 10 mg reglan IV push + 25 mg benadryl IV push at once  2nd line: - solumedrol 250mg IV x1  []Orthostatics    [] Darken/quiet room  [] Encourage avoidance of common migraine triggers (artificial sweeteners, food preservative (MSG), aged cheese, skipping meals, change in wake/sleep cycle and intense physical exertion)  [] Encourage lifestyle changes that help migraine: physical exercise, fixed meal time, fixed sleep/wake cycle, avoid smoking and highly caffeinated products     Patient can follow up with Dr. Nissenbaum after discharge. Please instruct the patient to call [phone] to schedule an appointment. Office is located at [address] [place] [address].     To be discussed with neurology attending and will be formally staffed by attending during morning rounds. Recommendations will be finalized once signed by attending.  HPI: A 23 year old R handed male with PMH of self-diagnosed migraine presents due to headache earlier today. Pt states that earlier today around 4 pm pt was playing video games and experienced a gradual onset scalp headache (nonposiitional and nonradiating) that was dull in sensation, at its worst 7/10. Pt states that right prior to the headache he experienced vision loss in temporal fiend of both eyes for about 15 minutes after which he got the headache. Headache was associated with nausea (no vomiting), photophobia, phonophobia flashes of light, dehydration. Headache was present for about 30 minutes after which it subsided. Pt states that he took Excedrin but states that it did not really help. During that time he also states that he picked up a case of water and felt pressure behind of eyes, with complete  loss of vision for a few seconds.  Currently feels back towards his baseline. Pt states for the last few years starting about 6 years ago- no prior head trauma noted.  He has been experiencing similar headache every 2-3 months preceded by blurry vision/loss of vision. But states that the headache can be monthly when he plays sports. Pt denies any recent numbness, tingling, focal weakness, changes in hearing, trauma. Denies ever having an MRI of the brain in the past.     Impression:  Headache likely migrainous primary headache with associated nausea, photophobia, phonophobia, flashes of light.     Recommendations:   [] F/U CT/CTA head- if negative for acute findings, can discharge pt with outpatient follow up.   []If good kidney function, toradol 30 mg IV push + 10 mg reglan IV push + 25 mg benadryl IV push at once   Otherwise Tylenol 1 gram IV + 10 mg reglan IV push + 25 mg benadryl IV push at once  2nd line: - solumedrol 250mg IV x1  []Orthostatics    [] Darken/quiet room  [] Encourage avoidance of common migraine triggers (artificial sweeteners, food preservative (MSG), aged cheese, skipping meals, change in wake/sleep cycle and intense physical exertion)  [] Encourage lifestyle changes that help migraine: physical exercise, fixed meal time, fixed sleep/wake cycle, avoid smoking and highly caffeinated products     Patient can follow up with Dr. Nissenbaum after discharge. Please instruct the patient to call [phone] to schedule an appointment. Office is located at [address] [place] [address].     To be discussed with neurology attending and will be formally staffed by attending during morning rounds. Recommendations will be finalized once signed by attending.

## 2024-03-24 NOTE — ED ADULT NURSE NOTE - CHIEF COMPLAINT
The patient is a 23y Male complaining of blurred vision.

## 2024-03-24 NOTE — ED ADULT TRIAGE NOTE - CHIEF COMPLAINT QUOTE
Pt reports about 1 hr PTA experienced an episode of b/l blurry vision while playing video games, reports similar episode occurred in november, "was told it was floaters." Reports concerned because this episode lasted a little longer, "15minutes." Also endorsing headache. Currently denies blurry vision, numbness/tingling, weakness, chest pain, sob, dizziness or any other symptoms. phx: migraines

## 2024-03-24 NOTE — ED PROVIDER NOTE - ATTENDING CONTRIBUTION TO CARE
Dr. Beltrán:  I have personally performed a face to face bedside history and physical examination of this patient. I have discussed the history, examination, review of systems, assessment and plan of management with the resident. I have reviewed the electronic medical record and amended it to reflect my history, review of systems, physical exam, assessment and plan.    23M c/o headaches and sometimes preceding visual changes ("part of vision goes out").  He has had these episodes for years, has not seen anyone for these symptoms.  Today, had similar episode.  Pt was playing video games and part of his vision went "dark", lasted 15min and self-resolved, then followed by headache.  Took Excedrin at home, feels improved now.    Exam:  - nad   - visual acuity 20/20 bilaterally, perrl  - rrr  - ctab  - abd soft ntnd  - no focal neuro deficits    A/P  - headache and transient vision change, suspect complex migraine, eval other intracranial pathology  - cbc, cmp, CTA head/neck, neuro consult  - meds, reassess

## 2024-09-04 ENCOUNTER — EMERGENCY (EMERGENCY)
Facility: HOSPITAL | Age: 24
LOS: 1 days | Discharge: HOME | End: 2024-09-04
Admitting: EMERGENCY MEDICINE
Payer: COMMERCIAL

## 2024-09-04 VITALS
WEIGHT: 179.9 LBS | TEMPERATURE: 98 F | OXYGEN SATURATION: 98 % | RESPIRATION RATE: 15 BRPM | SYSTOLIC BLOOD PRESSURE: 126 MMHG | HEART RATE: 75 BPM | DIASTOLIC BLOOD PRESSURE: 76 MMHG

## 2024-09-04 PROCEDURE — 99284 EMERGENCY DEPT VISIT MOD MDM: CPT

## 2024-09-05 VITALS
RESPIRATION RATE: 16 BRPM | HEART RATE: 76 BPM | OXYGEN SATURATION: 99 % | DIASTOLIC BLOOD PRESSURE: 60 MMHG | TEMPERATURE: 99 F | SYSTOLIC BLOOD PRESSURE: 105 MMHG

## 2025-04-07 ENCOUNTER — EMERGENCY (EMERGENCY)
Facility: HOSPITAL | Age: 25
LOS: 1 days | End: 2025-04-07
Attending: EMERGENCY MEDICINE | Admitting: EMERGENCY MEDICINE
Payer: COMMERCIAL

## 2025-04-07 VITALS
WEIGHT: 179.9 LBS | TEMPERATURE: 98 F | DIASTOLIC BLOOD PRESSURE: 70 MMHG | HEART RATE: 76 BPM | SYSTOLIC BLOOD PRESSURE: 129 MMHG | RESPIRATION RATE: 16 BRPM | OXYGEN SATURATION: 98 % | HEIGHT: 68 IN

## 2025-04-07 PROCEDURE — 99283 EMERGENCY DEPT VISIT LOW MDM: CPT

## 2025-04-07 RX ORDER — CIPROFLOXACIN AND DEXAMETHASONE 3; 1 MG/ML; MG/ML
4 SUSPENSION/ DROPS AURICULAR (OTIC)
Refills: 0
Start: 2025-04-07

## 2025-04-07 RX ORDER — ACETAMINOPHEN 500 MG/5ML
650 LIQUID (ML) ORAL ONCE
Refills: 0 | Status: COMPLETED | OUTPATIENT
Start: 2025-04-07 | End: 2025-04-07

## 2025-04-07 RX ORDER — CIPROFLOXACIN AND DEXAMETHASONE 3; 1 MG/ML; MG/ML
4 SUSPENSION/ DROPS AURICULAR (OTIC)
Qty: 7.5 | Refills: 0
Start: 2025-04-07 | End: 2025-04-13

## 2025-04-07 RX ORDER — ACETAMINOPHEN 500 MG/5ML
1000 LIQUID (ML) ORAL ONCE
Refills: 0 | Status: DISCONTINUED | OUTPATIENT
Start: 2025-04-07 | End: 2025-04-07

## 2025-04-07 RX ORDER — IBUPROFEN 200 MG
1 TABLET ORAL
Qty: 21 | Refills: 0
Start: 2025-04-07 | End: 2025-04-13

## 2025-04-07 RX ADMIN — Medication 650 MILLIGRAM(S): at 05:47

## 2025-04-07 NOTE — ED PROVIDER NOTE - ATTENDING CONTRIBUTION TO CARE
24M who PTED c/o right ear pain +OE on exam no mastoid tenderness or adenopathy  VSS  Plan d/c with ggts analgesics and ENT f/u to [facility] pRN

## 2025-04-07 NOTE — ED PROVIDER NOTE - CHIEF COMPLAINT
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 : N  Date of Last Liquid: 07/25/24  Time of Last Liquid: 0550  Date of Last Solid: 07/25/24  Time of Last Solid: 0030  Last Intake Type: Clear fluids  Time of Last Void: 0530         Physical Exam    Airway  Mallampati: II  TM distance: >3 FB  Neck ROM: full     Cardiovascular   Comments: Weak peripheral pulses, upper extremities   Dental - normal exam     Pulmonary   (+) decreased breath sounds     Abdominal - normal exam             Anesthesia Plan    History of general anesthesia?: yes  History of complications of general anesthesia?: no    ASA 3     general   (Invasive BP monitoring)  The patient is not a current smoker. (Quit in March 2024)    Anesthetic plan and risks discussed with patient.    Plan discussed with CRNA.       The patient is a 24y Male complaining of ear pain.

## 2025-04-07 NOTE — ED PROVIDER NOTE - NSFOLLOWUPINSTRUCTIONS_ED_ALL_ED_FT
What is Otitis Externa?    Otitis externa, also known as swimmer's ear, is an infection of the outer ear canal, the tube that runs from your eardrum to the outside of your head.    How to Use Your Ear Drops:    Wash your hands: Before and after using the ear drops, wash your hands thoroughly with soap and water.  Lie down or tilt your head: Lie on your side with the infected ear facing up, or tilt your head so the infected ear is towards the ceiling.  Pull your ear: For adults, gently pull your earlobe up and back. For children, gently pull the earlobe down and back. This straightens the ear canal so the drops can reach the infection.  Put in the drops: Put the prescribed number of drops into the ear canal. Don't touch the dropper tip to your ear to avoid contamination.  Keep your head tilted: Stay lying down or with your head tilted for 2-5 minutes to let the drops spread. You can gently press on the little flap of cartilage in front of your ear opening (the tragus) a few times to help the drops move down the ear canal.  Repeat: Use the drops as directed by your doctor, usually several times a day.  What to Expect and What to Avoid:    Discomfort should improve: You should start to feel better within a few days. Finish all the prescribed ear drops even if you feel better sooner. Stopping early can allow the infection to come back. You can use tylenol for pain relief.  Keep your ears dry: Avoid swimming or getting water in your ears until your doctor tells you it's okay. You can use earplugs when showering, but avoid putting anything else in your ear (like cotton swabs) unless specifically instructed by your doctor.  Avoid putting things in your ear: Don't put cotton swabs, fingers, or anything else in your ear canal. This can irritate the infection and make it worse.  Pain relief: You can take over-the-counter pain relievers like ibuprofen or acetaminophen for discomfort.  Follow-up: If your symptoms don’t improve after a few days of using the drops, or if they get worse, contact your doctor.

## 2025-04-07 NOTE — ED PROVIDER NOTE - CLINICAL SUMMARY MEDICAL DECISION MAKING FREE TEXT BOX
23 yo male no significant PMH presenting with R. ear pain for 1 day. Patient endorses throat pain, odynophagia. Denies ear drainage, erythema, fevers, chills. Tried OTC ear drops with minimal relief.    Patient afebrile. Exam shows secretions and erythema of external ear canal, clear tympanic membrane. Most consistent with otitis externa. Will dc home with ear drops.

## 2025-04-07 NOTE — ED ADULT TRIAGE NOTE - CHIEF COMPLAINT QUOTE
Pt c/o rigfh tear pain early yesterday.  P took some drops and Tylenol at 04:00.  Pain radiates from ear to jaw.  pt denies any discharge from ear.  Pt denies other medical history,

## 2025-04-07 NOTE — ED PROVIDER NOTE - PHYSICAL EXAMINATION
PHYSICAL EXAM:  GENERAL: Uncomfortable  EYES: EOMI, PERRLA, conjunctiva and sclera clear  ENMT: No tonsillar erythema, exudates, or enlargement; Moist mucous membranes. Tympanic membrane clear, no bulging. Secretions, some erythema in external canal. TTP anterior ear. Pain with manipulation of helix. No swelling or erythema of ear lobe.  NECK: Supple,  HEART: Regular rate and rhythm; No murmurs, rubs, or gallops  RESPIRATORY: CTA B/L, No W/R/R  SKIN: warm, dry, normal color, no rash or abnormal lesions

## 2025-04-07 NOTE — ED PROVIDER NOTE - OBJECTIVE STATEMENT
25 yo male no significant PMH presenting with R. ear pain for 1 day. Patient endorses throat pain, odynophagia. Denies ear drainage, erythema, fevers, chills. Tried OTC ear drops with minimal relief.

## 2025-04-07 NOTE — ED PROVIDER NOTE - PATIENT PORTAL LINK FT
You can access the FollowMyHealth Patient Portal offered by [facility] by registering at the following website: [email or web address]. By joining [employer]’s FollowMyHealth portal, you will also be able to view your health information using other applications (apps) compatible with our system.

## 2025-04-07 NOTE — ED ADULT NURSE NOTE - OBJECTIVE STATEMENT
Pt is A+O4 and ambulatory at baseline. Pt c/o left ear pain x this morning. Endorses the pain radiates from left ear down to jaw. Denies drainage from ear. Denies fevers, chills, n/v. Endorses sore throat with difficulty swallowing. States he used ear drops and took tylenol at 0400. Speech is clear, pt able to speak in full sentences. Spontaneous respirations are even and unlabored on room air.

## 2025-04-09 ENCOUNTER — EMERGENCY (EMERGENCY)
Facility: HOSPITAL | Age: 25
LOS: 1 days | End: 2025-04-09
Admitting: STUDENT IN AN ORGANIZED HEALTH CARE EDUCATION/TRAINING PROGRAM
Payer: COMMERCIAL

## 2025-04-09 ENCOUNTER — EMERGENCY (EMERGENCY)
Facility: HOSPITAL | Age: 25
LOS: 1 days | End: 2025-04-09
Admitting: EMERGENCY MEDICINE
Payer: COMMERCIAL

## 2025-04-09 VITALS
DIASTOLIC BLOOD PRESSURE: 68 MMHG | RESPIRATION RATE: 18 BRPM | SYSTOLIC BLOOD PRESSURE: 105 MMHG | HEART RATE: 64 BPM | OXYGEN SATURATION: 98 % | TEMPERATURE: 98 F | HEIGHT: 68 IN | WEIGHT: 179.9 LBS

## 2025-04-09 VITALS
DIASTOLIC BLOOD PRESSURE: 79 MMHG | TEMPERATURE: 98 F | RESPIRATION RATE: 16 BRPM | HEIGHT: 68 IN | WEIGHT: 179.9 LBS | SYSTOLIC BLOOD PRESSURE: 127 MMHG | HEART RATE: 75 BPM | OXYGEN SATURATION: 100 %

## 2025-04-09 PROCEDURE — 99283 EMERGENCY DEPT VISIT LOW MDM: CPT | Mod: 25

## 2025-04-09 RX ORDER — CIPROFLOXACIN AND FLUOCINOLONE ACETONIDE .75; .0625 MG/.25ML; MG/.25ML
0.25 SOLUTION AURICULAR (OTIC) ONCE
Refills: 0 | Status: DISCONTINUED | OUTPATIENT
Start: 2025-04-09 | End: 2025-04-09

## 2025-04-09 RX ORDER — AMOXICILLIN AND CLAVULANATE POTASSIUM 500; 125 MG/1; MG/1
1 TABLET, FILM COATED ORAL
Qty: 14 | Refills: 0
Start: 2025-04-09 | End: 2025-04-15

## 2025-04-09 RX ADMIN — Medication 4 DROP(S): at 13:21

## 2025-04-11 ENCOUNTER — APPOINTMENT (OUTPATIENT)
Dept: OTOLARYNGOLOGY | Facility: CLINIC | Age: 25
End: 2025-04-11